# Patient Record
Sex: MALE | Race: BLACK OR AFRICAN AMERICAN | NOT HISPANIC OR LATINO
[De-identification: names, ages, dates, MRNs, and addresses within clinical notes are randomized per-mention and may not be internally consistent; named-entity substitution may affect disease eponyms.]

---

## 2023-09-22 PROBLEM — Z00.00 ENCOUNTER FOR PREVENTIVE HEALTH EXAMINATION: Status: ACTIVE | Noted: 2023-09-22

## 2023-09-25 ENCOUNTER — APPOINTMENT (OUTPATIENT)
Dept: UROLOGY | Facility: CLINIC | Age: 68
End: 2023-09-25
Payer: COMMERCIAL

## 2023-09-25 VITALS
HEART RATE: 56 BPM | HEIGHT: 75 IN | SYSTOLIC BLOOD PRESSURE: 133 MMHG | WEIGHT: 185 LBS | BODY MASS INDEX: 23 KG/M2 | DIASTOLIC BLOOD PRESSURE: 85 MMHG

## 2023-09-25 DIAGNOSIS — Z78.9 OTHER SPECIFIED HEALTH STATUS: ICD-10-CM

## 2023-09-25 DIAGNOSIS — Z80.6 FAMILY HISTORY OF LEUKEMIA: ICD-10-CM

## 2023-09-25 PROCEDURE — 99204 OFFICE O/P NEW MOD 45 MIN: CPT

## 2023-10-18 ENCOUNTER — RESULT REVIEW (OUTPATIENT)
Age: 68
End: 2023-10-18

## 2023-10-18 ENCOUNTER — OUTPATIENT (OUTPATIENT)
Dept: OUTPATIENT SERVICES | Facility: HOSPITAL | Age: 68
LOS: 1 days | End: 2023-10-18
Payer: COMMERCIAL

## 2023-10-18 DIAGNOSIS — C61 MALIGNANT NEOPLASM OF PROSTATE: ICD-10-CM

## 2023-10-18 PROCEDURE — 72197 MRI PELVIS W/O & W/DYE: CPT | Mod: 26

## 2023-10-18 PROCEDURE — 72197 MRI PELVIS W/O & W/DYE: CPT

## 2023-10-18 PROCEDURE — A9579: CPT

## 2023-10-19 DIAGNOSIS — C61 MALIGNANT NEOPLASM OF PROSTATE: ICD-10-CM

## 2023-11-02 ENCOUNTER — OUTPATIENT (OUTPATIENT)
Dept: OUTPATIENT SERVICES | Facility: HOSPITAL | Age: 68
LOS: 1 days | End: 2023-11-02
Payer: COMMERCIAL

## 2023-11-02 DIAGNOSIS — C61 MALIGNANT NEOPLASM OF PROSTATE: ICD-10-CM

## 2023-11-02 LAB
GLUCOSE BLDC GLUCOMTR-MCNC: 91 MG/DL — SIGNIFICANT CHANGE UP (ref 70–99)
GLUCOSE BLDC GLUCOMTR-MCNC: 91 MG/DL — SIGNIFICANT CHANGE UP (ref 70–99)

## 2023-11-02 PROCEDURE — 78816 PET IMAGE W/CT FULL BODY: CPT | Mod: PS

## 2023-11-02 PROCEDURE — 82962 GLUCOSE BLOOD TEST: CPT

## 2023-11-02 PROCEDURE — 78816 PET IMAGE W/CT FULL BODY: CPT | Mod: 26

## 2023-11-02 PROCEDURE — A9595: CPT

## 2023-11-03 DIAGNOSIS — C61 MALIGNANT NEOPLASM OF PROSTATE: ICD-10-CM

## 2023-11-13 ENCOUNTER — APPOINTMENT (OUTPATIENT)
Dept: UROLOGY | Facility: CLINIC | Age: 68
End: 2023-11-13
Payer: COMMERCIAL

## 2023-11-13 VITALS
HEIGHT: 75 IN | TEMPERATURE: 97.1 F | SYSTOLIC BLOOD PRESSURE: 144 MMHG | DIASTOLIC BLOOD PRESSURE: 81 MMHG | HEART RATE: 65 BPM | BODY MASS INDEX: 22.63 KG/M2 | WEIGHT: 182 LBS

## 2023-11-13 PROCEDURE — 99214 OFFICE O/P EST MOD 30 MIN: CPT

## 2023-11-17 ENCOUNTER — TRANSCRIPTION ENCOUNTER (OUTPATIENT)
Age: 68
End: 2023-11-17

## 2023-11-17 ENCOUNTER — APPOINTMENT (OUTPATIENT)
Dept: UROLOGY | Facility: CLINIC | Age: 68
End: 2023-11-17
Payer: COMMERCIAL

## 2023-11-17 VITALS
BODY MASS INDEX: 23 KG/M2 | HEART RATE: 66 BPM | WEIGHT: 185 LBS | DIASTOLIC BLOOD PRESSURE: 91 MMHG | OXYGEN SATURATION: 97 % | SYSTOLIC BLOOD PRESSURE: 145 MMHG | HEIGHT: 75 IN | TEMPERATURE: 98.2 F

## 2023-11-17 DIAGNOSIS — Z83.3 FAMILY HISTORY OF DIABETES MELLITUS: ICD-10-CM

## 2023-11-17 DIAGNOSIS — R97.20 ELEVATED PROSTATE, SPECIFIC ANTIGEN [PSA]: ICD-10-CM

## 2023-11-17 DIAGNOSIS — Z78.9 OTHER SPECIFIED HEALTH STATUS: ICD-10-CM

## 2023-11-17 PROCEDURE — 99214 OFFICE O/P EST MOD 30 MIN: CPT

## 2023-11-17 RX ORDER — MULTIVIT-MIN/IRON/FOLIC ACID/K 18-600-40
CAPSULE ORAL
Refills: 0 | Status: ACTIVE | COMMUNITY

## 2023-11-20 ENCOUNTER — OUTPATIENT (OUTPATIENT)
Dept: OUTPATIENT SERVICES | Facility: HOSPITAL | Age: 68
LOS: 1 days | End: 2023-11-20
Payer: COMMERCIAL

## 2023-11-20 ENCOUNTER — TRANSCRIPTION ENCOUNTER (OUTPATIENT)
Age: 68
End: 2023-11-20

## 2023-11-20 DIAGNOSIS — N42.89 OTHER SPECIFIED DISORDERS OF PROSTATE: ICD-10-CM

## 2023-11-20 LAB
ANION GAP SERPL CALC-SCNC: 12 MMOL/L
APPEARANCE: CLEAR
BACTERIA UR CULT: NORMAL
BACTERIA: NEGATIVE /HPF
BILIRUBIN URINE: NEGATIVE
BLOOD URINE: NEGATIVE
BUN SERPL-MCNC: 10 MG/DL
CALCIUM SERPL-MCNC: 9.2 MG/DL
CAST: 0 /LPF
CHLORIDE SERPL-SCNC: 104 MMOL/L
CO2 SERPL-SCNC: 26 MMOL/L
COLOR: YELLOW
CREAT SERPL-MCNC: 1.06 MG/DL
EGFR: 77 ML/MIN/1.73M2
EPITHELIAL CELLS: 0 /HPF
GLUCOSE QUALITATIVE U: NEGATIVE MG/DL
GLUCOSE SERPL-MCNC: 95 MG/DL
HCT VFR BLD CALC: 46.4 %
HGB BLD-MCNC: 13.9 G/DL
KETONES URINE: NEGATIVE MG/DL
LEUKOCYTE ESTERASE URINE: NEGATIVE
MCHC RBC-ENTMCNC: 26.2 PG
MCHC RBC-ENTMCNC: 30 GM/DL
MCV RBC AUTO: 87.4 FL
MICROSCOPIC-UA: NORMAL
NITRITE URINE: NEGATIVE
PH URINE: 5.5
PLATELET # BLD AUTO: 263 K/UL
POTASSIUM SERPL-SCNC: 4.7 MMOL/L
PROTEIN URINE: NEGATIVE MG/DL
PSA SERPL-MCNC: 6.64 NG/ML
RBC # BLD: 5.31 M/UL
RBC # FLD: 16.2 %
RED BLOOD CELLS URINE: 0 /HPF
SODIUM SERPL-SCNC: 141 MMOL/L
SPECIFIC GRAVITY URINE: 1.01
UROBILINOGEN URINE: 0.2 MG/DL
WBC # FLD AUTO: 8.45 K/UL
WHITE BLOOD CELLS URINE: 0 /HPF

## 2023-11-20 PROCEDURE — 76377 3D RENDER W/INTRP POSTPROCES: CPT | Mod: 26

## 2023-11-20 PROCEDURE — 76377 3D RENDER W/INTRP POSTPROCES: CPT

## 2023-11-20 RX ORDER — CALCIUM CARBONATE/VITAMIN D3 600 MG-10
TABLET ORAL
Refills: 0 | Status: ACTIVE | COMMUNITY

## 2023-11-20 RX ORDER — SOY PROTEIN
POWDER (GRAM) ORAL
Refills: 0 | Status: ACTIVE | COMMUNITY

## 2023-11-21 DIAGNOSIS — N42.89 OTHER SPECIFIED DISORDERS OF PROSTATE: ICD-10-CM

## 2023-11-27 ENCOUNTER — APPOINTMENT (OUTPATIENT)
Dept: UROLOGY | Facility: CLINIC | Age: 68
End: 2023-11-27
Payer: COMMERCIAL

## 2023-11-27 VITALS
WEIGHT: 185 LBS | OXYGEN SATURATION: 96 % | BODY MASS INDEX: 23.12 KG/M2 | DIASTOLIC BLOOD PRESSURE: 92 MMHG | TEMPERATURE: 98.2 F | SYSTOLIC BLOOD PRESSURE: 179 MMHG | HEART RATE: 61 BPM

## 2023-11-27 PROCEDURE — 99214 OFFICE O/P EST MOD 30 MIN: CPT

## 2023-12-01 ENCOUNTER — RESULT REVIEW (OUTPATIENT)
Age: 68
End: 2023-12-01

## 2023-12-01 ENCOUNTER — APPOINTMENT (OUTPATIENT)
Dept: MRI IMAGING | Facility: CLINIC | Age: 68
End: 2023-12-01
Payer: COMMERCIAL

## 2023-12-01 ENCOUNTER — OUTPATIENT (OUTPATIENT)
Dept: OUTPATIENT SERVICES | Facility: HOSPITAL | Age: 68
LOS: 1 days | End: 2023-12-01

## 2023-12-01 PROCEDURE — 72197 MRI PELVIS W/O & W/DYE: CPT | Mod: 26

## 2023-12-01 PROCEDURE — 76498P: CUSTOM | Mod: 26

## 2023-12-01 PROCEDURE — 76377 3D RENDER W/INTRP POSTPROCES: CPT | Mod: 26

## 2023-12-05 ENCOUNTER — NON-APPOINTMENT (OUTPATIENT)
Age: 68
End: 2023-12-05

## 2023-12-11 ENCOUNTER — OUTPATIENT (OUTPATIENT)
Dept: OUTPATIENT SERVICES | Facility: HOSPITAL | Age: 68
LOS: 1 days | End: 2023-12-11

## 2023-12-13 ENCOUNTER — TRANSCRIPTION ENCOUNTER (OUTPATIENT)
Age: 68
End: 2023-12-13

## 2023-12-13 NOTE — ASU PATIENT PROFILE, ADULT - FALL HARM RISK - UNIVERSAL INTERVENTIONS
Bed in lowest position, wheels locked, appropriate side rails in place/Call bell, personal items and telephone in reach/Instruct patient to call for assistance before getting out of bed or chair/Non-slip footwear when patient is out of bed/[place] to call system/Physically safe environment - no spills, clutter or unnecessary equipment/Purposeful Proactive Rounding/Room/bathroom lighting operational, light cord in reach Bed in lowest position, wheels locked, appropriate side rails in place/Call bell, personal items and telephone in reach/Instruct patient to call for assistance before getting out of bed or chair/Non-slip footwear when patient is out of bed/[place] to call system/Physically safe environment - no spills, clutter or unnecessary equipment/Purposeful Proactive Rounding/Room/bathroom lighting operational, light cord in reach

## 2023-12-14 ENCOUNTER — TRANSCRIPTION ENCOUNTER (OUTPATIENT)
Age: 68
End: 2023-12-14

## 2023-12-14 ENCOUNTER — RESULT REVIEW (OUTPATIENT)
Age: 68
End: 2023-12-14

## 2023-12-14 ENCOUNTER — APPOINTMENT (OUTPATIENT)
Dept: UROLOGY | Facility: AMBULATORY SURGERY CENTER | Age: 68
End: 2023-12-14

## 2023-12-14 ENCOUNTER — OUTPATIENT (OUTPATIENT)
Dept: OUTPATIENT SERVICES | Facility: HOSPITAL | Age: 68
LOS: 1 days | Discharge: HOME | End: 2023-12-14
Payer: COMMERCIAL

## 2023-12-14 VITALS
WEIGHT: 175.71 LBS | TEMPERATURE: 98 F | RESPIRATION RATE: 17 BRPM | HEIGHT: 75 IN | OXYGEN SATURATION: 97 % | HEART RATE: 56 BPM | SYSTOLIC BLOOD PRESSURE: 145 MMHG | DIASTOLIC BLOOD PRESSURE: 87 MMHG

## 2023-12-14 VITALS
HEART RATE: 58 BPM | TEMPERATURE: 97 F | OXYGEN SATURATION: 95 % | SYSTOLIC BLOOD PRESSURE: 143 MMHG | RESPIRATION RATE: 15 BRPM | DIASTOLIC BLOOD PRESSURE: 85 MMHG

## 2023-12-14 DIAGNOSIS — Z98.890 OTHER SPECIFIED POSTPROCEDURAL STATES: Chronic | ICD-10-CM

## 2023-12-14 PROCEDURE — 55700: CPT | Mod: 22

## 2023-12-14 PROCEDURE — 76999F: CUSTOM | Mod: 26

## 2023-12-14 PROCEDURE — G0416: CPT | Mod: 26

## 2023-12-14 RX ORDER — ONDANSETRON 8 MG/1
4 TABLET, FILM COATED ORAL ONCE
Refills: 0 | Status: DISCONTINUED | OUTPATIENT
Start: 2023-12-14 | End: 2023-12-14

## 2023-12-14 RX ORDER — SODIUM CHLORIDE 9 MG/ML
1000 INJECTION, SOLUTION INTRAVENOUS
Refills: 0 | Status: DISCONTINUED | OUTPATIENT
Start: 2023-12-14 | End: 2023-12-14

## 2023-12-14 RX ORDER — FENTANYL CITRATE 50 UG/ML
25 INJECTION INTRAVENOUS
Refills: 0 | Status: DISCONTINUED | OUTPATIENT
Start: 2023-12-14 | End: 2023-12-14

## 2023-12-14 NOTE — PRE-ANESTHESIA EVALUATION ADULT - NSANTHOSAYNRD_GEN_A_CORE
No. DAMINE screening performed.  STOP BANG Legend: 0-2 = LOW Risk; 3-4 = INTERMEDIATE Risk; 5-8 = HIGH Risk No. DAMIEN screening performed.  STOP BANG Legend: 0-2 = LOW Risk; 3-4 = INTERMEDIATE Risk; 5-8 = HIGH Risk

## 2023-12-14 NOTE — ASU DISCHARGE PLAN (ADULT/PEDIATRIC) - NS MD DC FALL RISK RISK
For information on Fall & Injury Prevention, visit: [email or web address] OR  [email or web address] OR  https://www.cdc.gov/steadi/patient.html For information on Fall & Injury Prevention, visit: [email or web address]://www.[facility]/[email or web address]-[email or web address]-[email or web address]-mobility OR  [email or web address]://www.[facility]/news/[email or web address]-[email or web address]-to-[email or web address]-injury OR  https://www.cdc.gov/steadi/patient.html

## 2023-12-14 NOTE — ASU DISCHARGE PLAN (ADULT/PEDIATRIC) - ASU DC SPECIAL INSTRUCTIONSFT
PROSTATE BIOPSY    GENERAL: Expect blood in the urine, stool, and ejaculate for up to two (2) months postoperatively. This is normal and to be expected after this procedure. Increase hydration to keep the urine as clear as possible.    SURGICAL WOUND: There are often lumps and bumps that can be felt in the perineum (skin between scrotum and anus) for up to two (2) months or more post operatively. These are of no concern and with time they will soften and disappear.  Any “black and blue” bruising areas will also resolve.  You may apply an ice-pack for 15 minutes out of every hour for the first 24 -36 hours to minimize pain and swelling. You may apply over the counter Bacitracin to the wound several times a day, and keep covered with over the counter gauze as necessary.    CATHETER: Some patients are sent home with a Carmona catheter, while others go home urinating on their own. A Carmona catheter continuously drains the urine from the bladder. If you still have a catheter, the nurses will review instructions and care before you go home. For men, you may have a prescription for lidocaine jelly to apply to the tip of your penis, as needed, for catheter related discomfort.     PAIN: You may have some intermittent pain for up to six (6) weeks post operatively. Pain does not signify any problem unless associated with fever, chills, or inability to void.  If you experience any fevers or chills please call immediately as this may be signs of an infection. You may take Tylenol (acetaminophen) 650-975mg and/or Motrin (ibuprofen) 400-600mg, both available over the counter, for pain every 6 hours as needed. Do not exceed 4000mg of Tylenol (acetaminophen) daily. You may alternate these medications such that you take one or the other every 3 hours for around the clock pain coverage.     ANTICOAGULATION: If you are taking any blood thinning medications, please discuss with your urologist prior to restarting these medications unless otherwise specified.    BATHING: You may shower with soap and water, and pat dry the needle insertion sites in the perineum. Avoid sitting in water for prolonged periods of time for the next few days.    DIET: You may resume your regular diet and regular medication regimen.    ACTIVITY: No heavy lifting or strenuous exercise until you are evaluated at your post-operative appointment. Otherwise, you may return to your usual level of physical activity.    FOLLOW-UP: If you did not already schedule your post-operative appointment, please call your urologist to schedule and follow-up appointment.    CALL YOUR UROLOGIST IF: You have any bleeding that does not stop, inability to void >8 hours, fever over 100.4 F, chills, persistent nausea/vomiting, changes in your incision concerning for infection, or if your pain is not controlled on your discharge pain medications.

## 2023-12-14 NOTE — BRIEF OPERATIVE NOTE - NSICDXBRIEFPROCEDURE_GEN_ALL_CORE_FT
PROCEDURES:  Biopsy, prostate, with integrated MRI-transrectal US guidance 14-Dec-2023 16:32:50  Evette Heredia

## 2023-12-15 ENCOUNTER — NON-APPOINTMENT (OUTPATIENT)
Age: 68
End: 2023-12-15

## 2023-12-20 LAB
SURGICAL PATHOLOGY STUDY: SIGNIFICANT CHANGE UP
SURGICAL PATHOLOGY STUDY: SIGNIFICANT CHANGE UP

## 2023-12-21 ENCOUNTER — NON-APPOINTMENT (OUTPATIENT)
Age: 68
End: 2023-12-21

## 2024-01-03 ENCOUNTER — APPOINTMENT (OUTPATIENT)
Dept: UROLOGY | Facility: CLINIC | Age: 69
End: 2024-01-03
Payer: COMMERCIAL

## 2024-01-03 VITALS
WEIGHT: 185 LBS | HEIGHT: 75 IN | TEMPERATURE: 98 F | BODY MASS INDEX: 23 KG/M2 | HEART RATE: 64 BPM | SYSTOLIC BLOOD PRESSURE: 147 MMHG | DIASTOLIC BLOOD PRESSURE: 88 MMHG

## 2024-01-03 PROBLEM — Z78.9 OTHER SPECIFIED HEALTH STATUS: Chronic | Status: ACTIVE | Noted: 2023-12-14

## 2024-01-03 PROCEDURE — 99214 OFFICE O/P EST MOD 30 MIN: CPT

## 2024-01-03 PROCEDURE — 99215 OFFICE O/P EST HI 40 MIN: CPT

## 2024-01-03 NOTE — ADDENDUM
[FreeTextEntry1] : I, Dr. Angeles, personally performed the evaluation and management (E/M) services for this established patient who presents today with (a) new problem(s)/exacerbation of (an) existing condition(s).  That E/M includes conducting the examination, assessing all new/exacerbated conditions, and establishing a new plan of care.  Today, my ACP, Jane Carter, was here to observe my evaluation and management services for this new problem/exacerbated condition to be followed going forward.

## 2024-01-03 NOTE — PHYSICAL EXAM
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 PSMA PET/CT is negative for PSMA avid lesions.  The patient is opposed to surgery, he is very worried about ED and that is a high probability given lesion location - we could try to spare the right NVB  1. case to discuss with Dr. Godfrey - he wants a second opinion 2. discussed radiation is not best option for salvage - we discussed sometimes brachytherapy is done 3. candidate for salvage cryo with will needs spaceOAR Thank you very much for allowing me to assist in the care of this patient. Please do not hesitate to contact me with any additional questions or concerns.      Sincerely,     Presley Angeles D.O. Professor of Urology and Radiology  of Urology at [facility] Director for Prostate Cancer [address] [facility], [facility] Phone: [phone]

## 2024-01-03 NOTE — DISEASE MANAGEMENT
[1] : T1 [c] : c [X] : MX [0-10] : 0 -10 ng/mL [Biopsy] : Patient had a biopsy on [7(3+4)] : Template Biopsy [place] Score: 7(3+4) [IIB] : IIB [Radiation Therapy] : Radiation Therapy [BiopsyDate] : 4/23/2012 [TotalCores] : 12 [TotalPositiveCores] : 4 [MaxCoreInvolvement] : 50 [FreeTextEntry1] : GG3 detected on 12/14/23 Total number of cores at biopsy: 13. Total # of positives cores: 6. Max % Core Involvement: 90.   Template Biopsy Matthew Score: 7(4+3). Fusion Biopsy Boling Score: 7(3+4).

## 2024-01-03 NOTE — HISTORY OF PRESENT ILLNESS
[FreeTextEntry1] : Dear Dr. Jimenez,  Thank you so much for the referral to help care for your patient.  Chief Complaint: PSA Recurrence post Radiation Therapy Date of first visit: 11/17/2023  ABBEY REDDING is a 68 year old Black man with no PMHx who presents for prostate cancer with biochemical recurrence. He underwent a targeted prostate biopsy with the UroNav MRI fusion on 12/14/23. The biopsy was positive for GG3 recurrence. His PSA is 6.64 ng/ml.  This was focused on the left side only c/w with his prior known cyber knife.   He was diagnosed with GG2 in 2012 and treated with 35Gy (Cyberknife) 10/15/12-10/19/12 with Dr Snowden. No ADT was administered. He had a negative CT and bone scan at the time of diagnosis. His PSA at diagnosis was 3.1 ng/ml, PSA sarai 0.17 ng/ml, and most recent is 4.2 ng/ml. MRI on 10/18/23 demonstrated a PIRADS 4 lesion left mid-pzpm. He denies family history of prostate cancer.  11/2/23 PSMA PET/CT IMPRESSION: No current evidence of PSMA (PYLARIFY) avid lesions..  PSA Hx 6.64  11/17/2023 4.21 9/2023 1.32 9/2022 0.17 2021 0.17 2017  MRI Hx: MRI at [facility] on 10/18/2023. 19cc prostate with PIRADS 4 lesion measuring 56x16oi left mid pzpm. No LAD No EPE, No Bony Lesions. The clinical implications were discussed with the patient.  there is significant motion degredation.   Biopsy Hx 12/14/23- with Dr Angeles The standard biopsy detected GG3-GG2 cancer 5/12 cores (GG3 midline apex 60% 7mm 60% pattern 4 with PNI. GG2 COLE MB LPA LPB 90% 10mm 30% pattern 4 with PNI). Atypical glands detected in LL core. The targeted biopsy detected GG2 cancer (left base PZp 75% 7.5mm 20% pattern 4 with PNI).  4/23/2012- Left apex- GG1 1mm 14$ Left mid- GG2 7m 50% Left base- GG2 1.5mm 9% LLA- atypia LLM- GG2 2.5mm 20%  1/3/2024 IPSS 0 QOL 0  FRANCINE 25  11/17/2023 IPSS 3 QOL 0 FRANCINE 25  The patient denies fevers, chills, nausea and or vomiting and no unexplained weight loss.  All pertinent laboratory, films and physician notes were reviewed. Questionnaire results were discussed with patient.  Discussion with the patient: Risks/Benefits/FDA recommendations for prostate cancer screening, the natural history of prostate cancer, the concept of "competing risks", options for treatment including active surveillance, open and laparoscopic (Robotic) radical prostatectomy, external beam radiation therapy, interstitial brachytherapy ("seeds"), combined therapies, hormonal therapies, orchiectomy, and cryotherapy. The potential side effects, early and delayed risks, and effectiveness of each treatment was discussed. The specific details of this patient's disease and their bearing on the above were discussed. The patient was offered the opportunity to meet with the Radiation Oncologists. The patient asked appropriate contextual questions, indicating a good level of understanding.   We discussed the surgical options for management, including robotic and open prostatectomy. The patient understands that the risks of these procedures include bleeding infection, damage to the rectum and surrounding organs, and ED and urinary incontinence, both of which may be persistent. The advantages include removing the entire prostate for more accurate pathologic staging. There is more blood loss with the open approach than with the laparoscopic approach.   We also discussed the options of radiation (external beam, stereotactic body radiation therapy, brachytherapy or combination therapies) typically not done for Recurence. Definitive brachytherapy at our institution is done with Pd-103 either as monotherapy or in combination with external beam radiation (Dependent on risk).  The patient understands that the risks of radiation include increased LUTS, diarrhea, hematuria, difficulty urinating, ED, and urinary frequency.   We also discussed active surveillance. The patient understands that this is not a treatment but a way of monitoring potentially indolent disease. The patient understands that this has minimal side effects but there is a risk of disease progression.   We discussed salvage cryoablation with Oliva.   The risks include rectal injury, ED, and urethral stricture.    The patient and I discussed all of these options as well as their risks and benefits, and I believe I answered his questions. Based on the patient's disease characteristics, I feel the patient would be a good candidate for cryo, salvage surgery The patient decided to see Dr Saunders

## 2024-01-08 ENCOUNTER — APPOINTMENT (OUTPATIENT)
Dept: UROLOGY | Facility: CLINIC | Age: 69
End: 2024-01-08
Payer: COMMERCIAL

## 2024-01-08 PROCEDURE — 99214 OFFICE O/P EST MOD 30 MIN: CPT

## 2024-01-11 NOTE — LETTER BODY
[Dear  ___] : Dear  [unfilled], [Consult Letter:] : I had the pleasure of evaluating your patient, [unfilled]. [Please see my note below.] : Please see my note below. [Sincerely,] : Sincerely, [FreeTextEntry3] : Panfilo Jimenez MD, FACS

## 2024-01-11 NOTE — ASSESSMENT
[FreeTextEntry1] : 68-year-old with history of prostate cancer treated 11 years ago via CyberKnife as per patient. Patient states he was treated by Dr. Snowden in [facility].  Matthew score and prior records are not available to review.  Patient understands that he will need to get his prior records to further review.  He presents to office referred by his primary medical doctor for elevated PSA.  In 2017 his PSA was 0.17 ng/mL.  PSA of 2021 was 0.17 ng/mL.  PSA in September 2022 was 1.32 ng/mL.  His most recent PSA in September 2023 is 4.21 ng/mL.  Patient reports feeling well.  He denies any difficulties urinating.  He denies dysuria and gross hematuria.  MRI prostate 10/18/2023 IMPRESSION:  14 x 10 mm left posteromedial midgland (extending from base to apex) peripheral zone lesion. PI-RADS 4, high (clinically significant cancer likely). There is capsular bulging without gross extra prostatic extension.  No seminal vesicle invasion or pelvic lymphadenopathy.  PSMA scan 11/2023 Head/Neck: No biologically active head/neck lesions. Normal uptake within the brain, pharyngeal lymphoid tissue, salivary glands and laryngeal musculature is noted. Thorax:   No suspicious hypermetabolic mediastinal, hilar, lung parenchymal lesions. Abdomen/Pelvis: Physiologic GI/  activity. No abnormal visceral or shan tracer uptake is present. Surgical clips Musculoskeletal :  No suspicious hypermetabolic osseous lesions.  IMPRESSION:  No current evidence of  PSMA (PYLARIFY) avid lesions. Trabeculated urinary bladder may be seen with chronic outlet obstruction.  biopsy 12/14/2023 Final Diagnosis 1. Prostate, left anterior apex, needle biopsy - Residual/recurrent adenocarcinoma of the prostate, Prognostic Grade Group 2 ([place] score 3+4=7) involving 70% (7 mm in length) of 1 of 1 core - [place] pattern 4 comprises 10% of tumor  5. Prostate, midline apex, needle biopsy - Residual/recurrent adenocarcinoma of the prostate, Prognostic Grade Group 3 (Matthew score 4+3=7) involving 60% (7 mm in length) of 1 of 1 core - Matthew pattern 4 comprises 60% of tumor - Perineural invasion present  6. Prostate, midline base, needle biopsy - Residual/recurrent adenocarcinoma of the prostate, Prognostic Grade Group 2 (Matthew score 3+4=7) involving 25% (2 mm in length) of 1 of 1 core - Matthew pattern 4 comprises <10% of tumor  7. Prostate, left posterior apex, needle biopsy - Residual/recurrent adenocarcinoma of the prostate, Prognostic Grade Group 2 (Matthew score 3+4=7) involving 45% (4.5 mm in length) of 1 of 1 core - [place] pattern 4 comprises 10% of tumor  8. Prostate, left posterior base, needle biopsy - Residual/recurrent adenocarcinoma of the prostate, Prognostic Grade Group 2 (Matthew score 3+4=7) involving 90% (10 mm in length) of 1 of 1 core - [place] pattern 4 comprises 30% of tumor - Perineural invasion present  13. Prostate, left base PZP, needle biopsy - Residual/recurrent adenocarcinoma of the prostate, Prognostic Grade Group 2 (Matthew score 3+4=7) involving 75%, 5% and <5% (7.5 mm, 0.5 mm and 0.5 mm in length) of 3 of 3 cores - [place] pattern 4 comprises 20% of tumor - Perineural invasion present  the pathology was reviewed with the patient I discussed the options in detail  Plan the patient is not interested in cryotherapy or HIFU or salvage prostatectomy  the patient understands the risks and benefits of all optinos all options clearly outlined the radiographic studies were reviewed - Dr. Urena for consultation - Dr. Sales for HIGU - f/u with me after the aforementioned evaluation

## 2024-01-11 NOTE — HISTORY OF PRESENT ILLNESS
[FreeTextEntry1] : 68-year-old with history of prostate cancer treated 11 years ago via CyberKnife as per patient. Patient states he was treated by Dr. Snowden in [facility] [place].  Matthew score and prior records are not available to review.  Patient understands that he will need to get his prior records to further review.  He presents to office referred by his primary medical doctor for elevated PSA.  In 2017 his PSA was 0.17 ng/mL.  PSA of 2021 was 0.17 ng/mL.  PSA in September 2022 was 1.32 ng/mL.  His most recent PSA in September 2023 is 4.21 ng/mL.  Patient reports feeling well.  He denies any difficulties urinating.  He denies dysuria and gross hematuria.  MRI prostate 10/18/2023 IMPRESSION:  14 x 10 mm left posteromedial midgland (extending from base to apex) peripheral zone lesion. PI-RADS 4, high (clinically significant cancer likely). There is capsular bulging without gross extra prostatic extension.  No seminal vesicle invasion or pelvic lymphadenopathy.  PSMA scan 11/2023 Head/Neck: No biologically active head/neck lesions. Normal uptake within the brain, pharyngeal lymphoid tissue, salivary glands and laryngeal musculature is noted. Thorax:   No suspicious hypermetabolic mediastinal, hilar, lung parenchymal lesions. Abdomen/Pelvis: Physiologic GI/  activity. No abnormal visceral or shan tracer uptake is present. Surgical clips Musculoskeletal :  No suspicious hypermetabolic osseous lesions.  IMPRESSION:  No current evidence of  PSMA (PYLARIFY) avid lesions. Trabeculated urinary bladder may be seen with chronic outlet obstruction.  biopsy 12/14/2023 Final Diagnosis 1. Prostate, left anterior apex, needle biopsy - Residual/recurrent adenocarcinoma of the prostate, Prognostic Grade Group 2 ([place] score 3+4=7) involving 70% (7 mm in length) of 1 of 1 core - [place] pattern 4 comprises 10% of tumor  5. Prostate, midline apex, needle biopsy - Residual/recurrent adenocarcinoma of the prostate, Prognostic Grade Group 3 (Matthew score 4+3=7) involving 60% (7 mm in length) of 1 of 1 core - Matthew pattern 4 comprises 60% of tumor - Perineural invasion present  6. Prostate, midline base, needle biopsy - Residual/recurrent adenocarcinoma of the prostate, Prognostic Grade Group 2 (Matthew score 3+4=7) involving 25% (2 mm in length) of 1 of 1 core - Matthew pattern 4 comprises <10% of tumor  7. Prostate, left posterior apex, needle biopsy - Residual/recurrent adenocarcinoma of the prostate, Prognostic Grade Group 2 ([place] score 3+4=7) involving 45% (4.5 mm in length) of 1 of 1 core - [place] pattern 4 comprises 10% of tumor  8. Prostate, left posterior base, needle biopsy - Residual/recurrent adenocarcinoma of the prostate, Prognostic Grade Group 2 (Matthew score 3+4=7) involving 90% (10 mm in length) of 1 of 1 core - [place] pattern 4 comprises 30% of tumor - Perineural invasion present  13. Prostate, left base PZP, needle biopsy - Residual/recurrent adenocarcinoma of the prostate, Prognostic Grade Group 2 (Matthew score 3+4=7) involving 75%, 5% and <5% (7.5 mm, 0.5 mm and 0.5 mm in length) of 3 of 3 cores - [place] pattern 4 comprises 20% of tumor - Perineural invasion present  the pathology was reviewed with the patient I discussed the options in detail   [Urinary Retention] : no urinary retention [Urinary Frequency] : no urinary frequency [Nocturia] : no nocturia [Straining] : no straining [Weak Stream] : no weak stream [Dysuria] : no dysuria [Hematuria - Gross] : no gross hematuria

## 2024-01-12 ENCOUNTER — OUTPATIENT (OUTPATIENT)
Dept: OUTPATIENT SERVICES | Facility: HOSPITAL | Age: 69
LOS: 1 days | End: 2024-01-12
Payer: COMMERCIAL

## 2024-01-12 ENCOUNTER — APPOINTMENT (OUTPATIENT)
Dept: RADIATION ONCOLOGY | Facility: HOSPITAL | Age: 69
End: 2024-01-12
Payer: COMMERCIAL

## 2024-01-12 ENCOUNTER — NON-APPOINTMENT (OUTPATIENT)
Age: 69
End: 2024-01-12

## 2024-01-12 VITALS
OXYGEN SATURATION: 98 % | RESPIRATION RATE: 16 BRPM | SYSTOLIC BLOOD PRESSURE: 150 MMHG | HEART RATE: 59 BPM | BODY MASS INDEX: 22.75 KG/M2 | DIASTOLIC BLOOD PRESSURE: 73 MMHG | HEIGHT: 75 IN | WEIGHT: 183 LBS | TEMPERATURE: 98.2 F

## 2024-01-12 DIAGNOSIS — Z98.890 OTHER SPECIFIED POSTPROCEDURAL STATES: Chronic | ICD-10-CM

## 2024-01-12 DIAGNOSIS — C61 MALIGNANT NEOPLASM OF PROSTATE: ICD-10-CM

## 2024-01-12 PROCEDURE — 99204 OFFICE O/P NEW MOD 45 MIN: CPT

## 2024-01-12 NOTE — VITALS
[Date: ____________] : Patient's last distress assessment performed on [unfilled]. [0 - No Distress] : Distress Level: 0 [Patient given social work contact information and resource sheet] : Patient was given social work contact information and resource sheet [Maximal Pain Intensity: 0/10] : 0/10 [90: Able to carry normal activity; minor signs or symptoms of disease.] : 90: Able to carry normal activity; minor signs or symptoms of disease.  [ECOG Performance Status: 0 - Fully active, able to carry on all pre-disease performance without restriction] : Performance Status: 0 - Fully active, able to carry on all pre-disease performance without restriction

## 2024-01-12 NOTE — PHYSICAL EXAM
[General Appearance - Well Developed] : well developed [General Appearance - Alert] : alert [General Appearance - In No Acute Distress] : in no acute distress [] : no respiratory distress [Oriented To Time, Place, And Person] : oriented to person, place, and time

## 2024-01-16 DIAGNOSIS — C61 MALIGNANT NEOPLASM OF PROSTATE: ICD-10-CM

## 2024-01-23 NOTE — END OF VISIT
[Time Spent: ___ minutes] : I have spent [unfilled] minutes of time on the encounter. [FreeTextEntry3] : MD Note: I was present with the N.P. during the key portions of the history and exam. I agree with the findings and plan as documented in the N.P's note, unless noted below.  I was physically present for the key portions of the evaluation and management service provided. I agree with the history and physical, and plan which I have reviewed and edited where appropriate.

## 2024-01-23 NOTE — HISTORY OF PRESENT ILLNESS
[FreeTextEntry1] : Mr.Wilfredo Laguerre is a 68yr old male here for initial consultation. He has h/o prostate cancer, and as per patient, he was treated in 2012 with "cyberknife" in [place] with .(Records pending). He was referred to urology by his PCP last September after his PSA showed elevation. PSA had gone from 0.17ng/ml in 2021 to 1.32ng/ml in Sept 2022. and then his PSA in Sept 2023 was up to 4.21ng/ml. Most recent PSA was 6.64ng/ml from 11/20/2023. He underwent a targeted prostate biopsy with the UroNav MRI fusion on 12/14/23 which showed recurrence, 5/13 cores with Matthew 7(3+4) and 1/13 cores with Matthew 7(4+3). He has also seen  from [facility] for a consultation. He denies any bothersome urinary symptoms, and extremely concerned with possibly getting side effects of any urinary incontinence or any erectile dysfunction. His EPIC and IPSS scores are "0" on todays assessment.  PSA Trend: 9/2021 0.17ng/ml 9/2022 1.32ng/ml 9/2023 4.21ng/ml 11/2023 6.64ng/ml   Most recent imaging: PSMA 11/2/2023: IMPRESSION: No current evidence of  PSMA (PYLARIFY) avid lesions.  MRI prostate:12/1/2023: IMPRESSION: Left, posterior medial-lateral (PZpm-pl), base-to-midgland, peripheral zone lesion as detailed above. Overlying capsular bulge suspicious for NIKOLAI. SILVESTRE and boundary marked in DCAD. PIRADS 5 - Very high (clinically significant cancer is highly likely to be present)  No seminal vesicle invasion or pelvic lymphadenopathy.  Prostate Volume: 27 mL PSA density: 0.24 ng/mL/mL  Most recent prostate biopsy pathology 12/14/2023: Final Diagnosis 1. Prostate, left anterior apex, needle biopsy- Residual/recurrent adenocarcinoma of the prostate, Prognostic Grade Group 2 (Scalf score 3+4=7) involving 70% (7 mm in length) of 1of 1 core - Scalf pattern 4 comprises 10% of tumor 2. Prostate, left anterior base, needle biopsy- Benign prostatic tissue with radiotherapy related changes 3. Prostate, right anterior apex, needle biopsy- Benign prostatic tissue with radiotherapy related changes 4. Prostate, right anterior base, needle biopsy- Benign prostatic tissue with radiotherapy related changes 5. Prostate, midline apex, needle biopsy- Residual/recurrent adenocarcinoma of the prostate, Prognostic Grade Group 3 (Matthew score 4+3=7) involving 60% (7 mm in length) of 1of 1 core - Matthew pattern 4 comprises 60% of tumor- Perineural invasion present 6. Prostate, midline base, needle biopsy- Residual/recurrent adenocarcinoma of the prostate, Prognostic Grade Group 2 (Scalf score 3+4=7) involving 25% (2 mm in length) of 1of 1 core - Scalf pattern 4 comprises <10% of tumor 7. Prostate, left posterior apex, needle biopsy- Residual/recurrent adenocarcinoma of the prostate, Prognostic Grade Group 2 (Scalf score 3+4=7) involving 45% (4.5 mm in length) of 1of 1 core - Scalf pattern 4 comprises 10% of tumor 8. Prostate, left posterior base, needle biopsy- Residual/recurrent adenocarcinoma of the prostate, Prognostic Grade Group 2 (Matthew score 3+4=7) involving 90% (10 mm in length) of 1of 1 core - Scalf pattern 4 comprises 30% of tumor- Perineural invasion present 9. Prostate, right posterior apex, needle biopsy- Benign fibromuscular tissue 10. Prostate, right posterior base, needle biopsy- Benign prostatic tissue with radiotherapy related changes 11. Prostate, left lateral, needle biopsy- Small focus of atypical glands, suspicious for adenocarcinoma 12. Prostate, right lateral, needle biopsy- Benign prostatic tissue with radiotherapy related changes 13. Prostate, left base PZP, needle biopsy- Residual/recurrent adenocarcinoma of the prostate, Prognostic Grade Group 2 (Scalf score 3+4=7) involving 75%, 5% and <5% (7.5 mm, 0.5 mm and 0.5 mm in length) of 3 of 3 cores- Scalf pattern 4 comprises 20% of tumor - Perineural invasion present

## 2024-02-23 ENCOUNTER — APPOINTMENT (OUTPATIENT)
Dept: UROLOGY | Facility: CLINIC | Age: 69
End: 2024-02-23
Payer: COMMERCIAL

## 2024-02-23 VITALS
TEMPERATURE: 98 F | WEIGHT: 183 LBS | DIASTOLIC BLOOD PRESSURE: 84 MMHG | OXYGEN SATURATION: 98 % | RESPIRATION RATE: 16 BRPM | HEIGHT: 75 IN | HEART RATE: 56 BPM | BODY MASS INDEX: 22.75 KG/M2 | SYSTOLIC BLOOD PRESSURE: 143 MMHG

## 2024-02-23 PROCEDURE — 99214 OFFICE O/P EST MOD 30 MIN: CPT | Mod: 25

## 2024-02-23 PROCEDURE — 81003 URINALYSIS AUTO W/O SCOPE: CPT | Mod: QW

## 2024-02-25 LAB
BILIRUB UR QL STRIP: NORMAL
COLLECTION METHOD: NORMAL
GLUCOSE UR-MCNC: NORMAL
HCG UR QL: 0.2 EU/DL
HGB UR QL STRIP.AUTO: NORMAL
KETONES UR-MCNC: NORMAL
LEUKOCYTE ESTERASE UR QL STRIP: NORMAL
NITRITE UR QL STRIP: NORMAL
PH UR STRIP: 5.5
PROT UR STRIP-MCNC: NORMAL
SP GR UR STRIP: 1.01

## 2024-02-25 NOTE — HISTORY OF PRESENT ILLNESS
[FreeTextEntry1] : 69 yo male pt who was referred by Dr. Jimenez to discuss HIFU. He has a history of prostate cancer treated 12 years ago with a Cyberknife with a Dr. Snowden in [place]. His PSA then veered to 0.17 and it was gradually rising to 1.32 in September of 2022 and then 4.21 in Sept 2023, and 6.64 in Nov. 2023. He underwent biopsies which showed 5/13 cores with Lang[place] 3,4. He would like to retry Cyberknife and will be seeing someone at [facility] and  regarding that issue. He comes in to discuss High frequency ultrasound treatment, which is available now with me. He is currently sexually active with reasonable erections and mild urinary frequency. he denies any gross hematuria and dysuria and had overall excellent tolerance and no side effects with the Cyberknife.

## 2024-02-25 NOTE — ASSESSMENT
[FreeTextEntry1] : 67 yo male pt with recurrent prostate cancer grade group 3 in 1 core, otherwise grade group 2 disease. I discussed some of the pros and cons related to High frequency ultrasound treatment. He is going to follow up with [facility] and also radiation at [facility] to rediscuss Cyberknife therapy. If he has further interest he'll contact me. I have told him that Data for grade group 3 would not be as robust as data for grade group 2. All of his questions were answered. Total time=30 min.  The submitted E/M billing level for this visit reflects the total time spent on the day of the visit including face-to-face time spent with the patient, non-face-to-face review of medical records and relevant information, documentation, and asynchronous communication with the patient after a visit via phone, email, or patients EHR portal after the visit. The medical records reviewed are either scanned into the chart or reviewed with the patient using a patients electronic medical records portal for patients with records not available to [facility] via electronic transmission platforms from other institutions and labs. Time spent counseling and performing coordination of care was also included in determining the appropriate EM billing level.   I have reviewed and verified information regarding the chief complaint and history recorded by the ancillary staff and/or the patient. I have independently reviewed and interpreted tests performed by other physicians and facilities as necessary.   I have discussed with the patient differential diagnosis, reason for auxiliary tests if ordered, risks, benefits, alternatives, and complications of each form of therapy were discussed.

## 2024-03-04 ENCOUNTER — APPOINTMENT (OUTPATIENT)
Dept: RADIATION ONCOLOGY | Facility: CLINIC | Age: 69
End: 2024-03-04
Payer: COMMERCIAL

## 2024-03-04 ENCOUNTER — NON-APPOINTMENT (OUTPATIENT)
Age: 69
End: 2024-03-04

## 2024-03-04 VITALS
DIASTOLIC BLOOD PRESSURE: 90 MMHG | HEART RATE: 60 BPM | OXYGEN SATURATION: 99 % | WEIGHT: 180 LBS | HEIGHT: 75 IN | RESPIRATION RATE: 16 BRPM | TEMPERATURE: 98.3 F | SYSTOLIC BLOOD PRESSURE: 140 MMHG | BODY MASS INDEX: 22.38 KG/M2

## 2024-03-04 DIAGNOSIS — C61 MALIGNANT NEOPLASM OF PROSTATE: ICD-10-CM

## 2024-03-04 PROCEDURE — 99205 OFFICE O/P NEW HI 60 MIN: CPT

## 2024-03-04 NOTE — DISEASE MANAGEMENT
[MeasuredProstateVolume] : 27ml [BiopsyDate] : 12/14/2023 [TotalCores] : 13 [TotalPositiveCores] : 6 [MaxCoreInvolvement] : 90%

## 2024-03-04 NOTE — PHYSICAL EXAM
[Normal] : normal external genitalia without lesions and no testicular masses [Normal] : no focal deficits

## 2024-03-04 NOTE — HISTORY OF PRESENT ILLNESS
[FreeTextEntry1] :   El Ruano is a 68 year old male with no family history of Prostate Ca who has a  known history of Prostate Cancer in 2012 in [place] with Dr. Snowden  as per pt. treated with cyberknife. treatmenet for prostate cancer. Matthew score and prior records are not availabel to review and pt will obtain the records for us to review.  Mr. Dumont  September 2023 his PCP noted an increase in his PSA value  4.21ng.mL. He underwent MRi fusion Bx on 11/17/2023 that revealed 6 out of 13 cores are positive with 20%-90% tissue involvement. one core with [place] score of 7 ( 4+3) and five cores with a Matthew Score 7 ( 3+4) with  a diagnosis of  unfavorable intermediate risk Prostatic Adenocarcinoma,  (Urologist: Dr. Barbara Ortiz Ma)  PSA Trend: ng/mL  09/2021:         0.17ng/mL 09/2022:         1.32ng/mL 09/12/2022:    4.21ng/mL 11/27/2023:    6.64ng/mL  MRI Prostate with and without  12/1/2023  IMPRESSION: Left, posterior medial-lateral (PZpm-pl), base-to-midgland, peripheral zone lesion as detailed above. Overlying capsular bulge suspicious for NIKOLAI. SILVESTRE and boundary marked in DCAD. PIRADS 5 - Very high (clinically significant cancer is highly likely to be present)  No seminal vesicle invasion or pelvic lymphadenopathy.  Prostate Volume: 27 mL PSA density: 0.24 ng/mL/mL  LESION: #1 Location: Left, posterior medial-lateral (PZpm-pl), base-to-midgland, peripheral zone. Slice#: Series 4, Image 14. Size (transverse, AP, CC): 22 x 10 x 16 mm. T2-WI: 2 - diffuse mild hypointensity, indistinct margin. DWI: 5 - Focal markedly hypointense on ADC and markedly hyperintense on high b-value DWI; greater than 1.5 cm in greatest dimension and invasive behavior. DCE: Positive - focal, and; earlier than or contemporaneous with enhancement of adjacent normal prostatic tissues, and; corresponds to suspicious finding on T2W and/or DWI. Extra-prostatic extension: Tumor bulges or deforms capsule without neurovascular bundle thickening. Prior Comparison: None. PI-RADS Assessment Category: 5, Very High  Neurovascular bundle: Left-sided neurovascular bundle abutment. Seminal vesicles: No seminal vesicle invasion. Lymph nodes: No pelvic adenopathy. Bones: No suspicious lesions identified. Urinary bladder: Underdistended limiting evaluation. Other: None.   PATHOLOGY: 12/1/2023  6 out of 13 cores positive [place] Score 7 =(4+3)-  1 core [place] Score 7 =( 3+4)   5 cores GG 20-90% tissue involvement  PET /CT PSMA  11/2/2023  IMPRESSION:  No current evidence of  PSMA (PYLARIFY) avid lesions..   Patient presents today for consideration of radiation therapy options to the prostate, referred by Dr. Urena Overall, the patient states he feels well and he did receive SBRT  about 11 years ago in [place] by Dr. Snowden ( pt will need to provide us with the information)py in the past. He notes baseline urine function with and nocturia x1 no use of flomax  He denies urgency, urinary frequency, dribbling, urinary retention, dysuria, hematuria, leakage or incontinence. He has well-formed bowel movements. He denies blood or mucous in the stool. He denies rectal pain and states a no history of hemorrhoids. He had his last colonoscopy in  2022 and his next colonoscopy is due in 2027. He has never received ADT therapy in the past. He is sexually active and no issues with erections and on no ED medication needed.

## 2024-03-04 NOTE — REVIEW OF SYSTEMS
[Negative] : Allergic/Immunologic [Anal Pain: Grade 0] : Anal Pain: Grade 0 [Diarrhea: Grade 0] : Diarrhea: Grade 0 [Constipation: Grade 0] : Constipation: Grade 0 [Dyspepsia: Grade 0] : Dyspepsia: Grade 0 [Dysphagia: Grade 0] : Dysphagia: Grade 0 [Esophagitis: Grade 0] : Esophagitis: Grade 0 [Fecal Incontinence: Grade 0] : Fecal Incontinence: Grade 0 [Gastroparesis: Grade 0] : Gastroparesis: Grade 0 [Nausea: Grade 0] : Nausea: Grade 0 [Proctitis: Grade 0] : Proctitis: Grade 0 [Rectal Pain: Grade 0] : Rectal Pain: Grade 0 [Small Intestinal Obstruction: Grade 0] : Small Intestinal Obstruction: Grade 0 [Vomiting: Grade 0] : Vomiting: Grade 0 [Hematuria: Grade 0] : Hematuria: Grade 0 [Urinary Incontinence: Grade 0] : Urinary Incontinence: Grade 0  [Urinary Retention: Grade 0] : Urinary Retention: Grade 0 [Urinary Tract Pain: Grade 0] : Urinary Tract Pain: Grade 0 [Urinary Frequency: Grade 0] : Urinary Frequency: Grade 0 [Urinary Urgency: Grade 0] : Urinary Urgency: Grade 0 [Erectile Dysfunction: Grade 0] : Erectile Dysfunction: Grade 0 [Ejaculation Disorder: Grade 0] : Ejaculation Disorder: Grade 0

## 2024-03-12 ENCOUNTER — TRANSCRIPTION ENCOUNTER (OUTPATIENT)
Age: 69
End: 2024-03-12

## 2024-03-13 ENCOUNTER — NON-APPOINTMENT (OUTPATIENT)
Age: 69
End: 2024-03-13

## 2024-03-18 ENCOUNTER — TRANSCRIPTION ENCOUNTER (OUTPATIENT)
Age: 69
End: 2024-03-18

## 2024-03-19 ENCOUNTER — NON-APPOINTMENT (OUTPATIENT)
Age: 69
End: 2024-03-19

## 2024-04-03 ENCOUNTER — APPOINTMENT (OUTPATIENT)
Dept: UROLOGY | Facility: CLINIC | Age: 69
End: 2024-04-03

## 2024-08-09 PROCEDURE — 77316 BRACHYTX ISODOSE PLAN SIMPLE: CPT | Mod: 26

## 2024-08-12 ENCOUNTER — TRANSCRIPTION ENCOUNTER (OUTPATIENT)
Age: 69
End: 2024-08-12

## 2024-08-12 ENCOUNTER — APPOINTMENT (OUTPATIENT)
Dept: RADIATION ONCOLOGY | Facility: CLINIC | Age: 69
End: 2024-08-12
Payer: COMMERCIAL

## 2024-08-12 VITALS
RESPIRATION RATE: 20 BRPM | DIASTOLIC BLOOD PRESSURE: 91 MMHG | BODY MASS INDEX: 22.5 KG/M2 | OXYGEN SATURATION: 99 % | WEIGHT: 180 LBS | SYSTOLIC BLOOD PRESSURE: 158 MMHG | HEART RATE: 60 BPM | TEMPERATURE: 98.4 F

## 2024-08-12 VITALS
OXYGEN SATURATION: 97 % | WEIGHT: 178.35 LBS | TEMPERATURE: 97 F | RESPIRATION RATE: 16 BRPM | HEIGHT: 75 IN | HEART RATE: 52 BPM | DIASTOLIC BLOOD PRESSURE: 94 MMHG | SYSTOLIC BLOOD PRESSURE: 161 MMHG

## 2024-08-12 DIAGNOSIS — C61 MALIGNANT NEOPLASM OF PROSTATE: ICD-10-CM

## 2024-08-12 PROBLEM — Z78.9 OTHER SPECIFIED HEALTH STATUS: Chronic | Status: INACTIVE | Noted: 2023-12-14 | Resolved: 2024-08-12

## 2024-08-12 PROCEDURE — 99213 OFFICE O/P EST LOW 20 MIN: CPT

## 2024-08-12 NOTE — HISTORY OF PRESENT ILLNESS
[FreeTextEntry1] :   El Ruano is a 69 year old male with no family history of Prostate Ca who has a known history of Prostate Cancer in 2012 in [place] with Dr. Snowden as per pt. treated with cyberknife. treatment for prostate cancer. [place] score and prior records are not available to review and pt will obtain the records for us to review.  Mr. Dumont September 2023 his PCP noted an increase in his PSA value  4.21ng.mL. He underwent MRi fusion Bx on 11/17/2023 that revealed 6 out of 13 cores are positive with 20%-90% tissue involvement. one core with Matthew score of 7 (4+3) and five cores with a [place] Score 7 (3+4) with a diagnosis of unfavorable intermediate risk Prostatic Adenocarcinoma,  (Urologist: Dr. Barbara Ortiz Ma)  PSA Trend: ng/mL  09/2021:         0.17ng/mL 09/2022:         1.32ng/mL 09/12/2022:    4.21ng/mL 11/27/2023:    6.64ng/mL  MRI Prostate with and without  12/1/2023  IMPRESSION: Left, posterior medial-lateral (PZpm-pl), base-to-midgland, peripheral zone lesion as detailed above. Overlying capsular bulge suspicious for NIKOLAI. SILVESTRE and boundary marked in DCAD. PIRADS 5 - Very high (clinically significant cancer is highly likely to be present)  No seminal vesicle invasion or pelvic lymphadenopathy.  Prostate Volume: 27 mL PSA density: 0.24 ng/mL/mL  LESION: #1 Location: Left, posterior medial-lateral (PZpm-pl), base-to-midgland, peripheral zone. Slice#: Series 4, Image 14. Size (transverse, AP, CC): 22 x 10 x 16 mm. T2-WI: 2 - diffuse mild hypointensity, indistinct margin. DWI: 5 - Focal markedly hypointense on ADC and markedly hyperintense on high b-value DWI; greater than 1.5 cm in greatest dimension and invasive behavior. DCE: Positive - focal, and; earlier than or contemporaneous with enhancement of adjacent normal prostatic tissues, and; corresponds to suspicious finding on T2W and/or DWI. Extra-prostatic extension: Tumor bulges or deforms capsule without neurovascular bundle thickening. Prior Comparison: None. PI-RADS Assessment Category: 5, Very High  Neurovascular bundle: Left-sided neurovascular bundle abutment. Seminal vesicles: No seminal vesicle invasion. Lymph nodes: No pelvic adenopathy. Bones: No suspicious lesions identified. Urinary bladder: Underdistended limiting evaluation. Other: None.   PATHOLOGY: 12/1/2023  6 out of 13 cores positive [place] Score 7 =(4+3)-  1 core [place] Score 7 =( 3+4)   5 cores GG 20-90% tissue involvement  PET /CT PSMA  11/2/2023  IMPRESSION:  No current evidence of  PSMA (PYLARIFY) avid lesions..   Patient presents today for re discussion of brachytherapy. Questions answered and preop instructions reviewed with patient.

## 2024-08-12 NOTE — HISTORY OF PRESENT ILLNESS
[FreeTextEntry1] :   El Ruano is a 69 year old male with no family history of Prostate Ca who has a known history of Prostate Cancer in 2012 in [place] with Dr. Snowden as per pt. treated with cyberknife. treatment for prostate cancer. [place] score and prior records are not available to review and pt will obtain the records for us to review.  Mr. Dumont September 2023 his PCP noted an increase in his PSA value  4.21ng.mL. He underwent MRi fusion Bx on 11/17/2023 that revealed 6 out of 13 cores are positive with 20%-90% tissue involvement. one core with Matthew score of 7 (4+3) and five cores with a [place] Score 7 (3+4) with a diagnosis of unfavorable intermediate risk Prostatic Adenocarcinoma,  (Urologist: Dr. Barbara Ortiz Ma)  PSA Trend: ng/mL  09/2021:         0.17ng/mL 09/2022:         1.32ng/mL 09/12/2022:    4.21ng/mL 11/27/2023:    6.64ng/mL  MRI Prostate with and without  12/1/2023  IMPRESSION: Left, posterior medial-lateral (PZpm-pl), base-to-midgland, peripheral zone lesion as detailed above. Overlying capsular bulge suspicious for NIKOLAI. SILVESTRE and boundary marked in DCAD. PIRADS 5 - Very high (clinically significant cancer is highly likely to be present)  No seminal vesicle invasion or pelvic lymphadenopathy.  Prostate Volume: 27 mL PSA density: 0.24 ng/mL/mL  LESION: #1 Location: Left, posterior medial-lateral (PZpm-pl), base-to-midgland, peripheral zone. Slice#: Series 4, Image 14. Size (transverse, AP, CC): 22 x 10 x 16 mm. T2-WI: 2 - diffuse mild hypointensity, indistinct margin. DWI: 5 - Focal markedly hypointense on ADC and markedly hyperintense on high b-value DWI; greater than 1.5 cm in greatest dimension and invasive behavior. DCE: Positive - focal, and; earlier than or contemporaneous with enhancement of adjacent normal prostatic tissues, and; corresponds to suspicious finding on T2W and/or DWI. Extra-prostatic extension: Tumor bulges or deforms capsule without neurovascular bundle thickening. Prior Comparison: None. PI-RADS Assessment Category: 5, Very High  Neurovascular bundle: Left-sided neurovascular bundle abutment. Seminal vesicles: No seminal vesicle invasion. Lymph nodes: No pelvic adenopathy. Bones: No suspicious lesions identified. Urinary bladder: Underdistended limiting evaluation. Other: None.   PATHOLOGY: 12/1/2023  6 out of 13 cores positive [place] Score 7 =(4+3)-  1 core [place] Score 7 =( 3+4)   5 cores GG 20-90% tissue involvement  PET /CT PSMA  11/2/2023  IMPRESSION:  No current evidence of  PSMA (PYLARIFY) avid lesions..   Patient presents today for re discussion of brachytherapy. Questions answered and preop instructions reviewed with patient.

## 2024-08-12 NOTE — DISEASE MANAGEMENT
[2] : T2 [c] : c [X] : MX [Biopsy with Fusion] : Patient had a biopsy with fusion on [7(4+3)] : Fusion Biopsy Hodge Score: 7(4+3) [5] : 5 [IIC] : IIC [BiopsyDate] : 12/14/2023 [MeasuredProstateVolume] : 27ml [TotalCores] : 13 [TotalPositiveCores] : 6 [MaxCoreInvolvement] : 90%

## 2024-08-12 NOTE — DISEASE MANAGEMENT
[2] : T2 [c] : c [X] : MX [Biopsy with Fusion] : Patient had a biopsy with fusion on [7(4+3)] : Fusion Biopsy [place] Score: 7(4+3) [5] : 5 [IIC] : IIC [BiopsyDate] : 12/14/2023 [MeasuredProstateVolume] : 27ml [TotalCores] : 13 [TotalPositiveCores] : 6 [MaxCoreInvolvement] : 90%

## 2024-08-12 NOTE — ASU PATIENT PROFILE, ADULT - NSICDXPASTSURGICALHX_GEN_ALL_CORE_FT
PAST SURGICAL HISTORY:  History of prostate biopsy      PAST SURGICAL HISTORY:  H/O circumcision child    History of prostate biopsy x 2

## 2024-08-13 ENCOUNTER — OUTPATIENT (OUTPATIENT)
Dept: INPATIENT UNIT | Facility: HOSPITAL | Age: 69
LOS: 1 days | Discharge: HOME | End: 2024-08-13
Payer: COMMERCIAL

## 2024-08-13 ENCOUNTER — TRANSCRIPTION ENCOUNTER (OUTPATIENT)
Age: 69
End: 2024-08-13

## 2024-08-13 VITALS
DIASTOLIC BLOOD PRESSURE: 94 MMHG | RESPIRATION RATE: 18 BRPM | HEART RATE: 53 BPM | SYSTOLIC BLOOD PRESSURE: 161 MMHG | OXYGEN SATURATION: 99 %

## 2024-08-13 DIAGNOSIS — Z98.890 OTHER SPECIFIED POSTPROCEDURAL STATES: Chronic | ICD-10-CM

## 2024-08-13 PROCEDURE — 77316 BRACHYTX ISODOSE PLAN SIMPLE: CPT

## 2024-08-13 PROCEDURE — 77778 APPLY INTERSTIT RADIAT COMPL: CPT | Mod: 26

## 2024-08-13 PROCEDURE — 77370 RADIATION PHYSICS CONSULT: CPT

## 2024-08-13 PROCEDURE — 77336 RADIATION PHYSICS CONSULT: CPT

## 2024-08-13 PROCEDURE — 77332 RADIATION TREATMENT AID(S): CPT

## 2024-08-13 PROCEDURE — 77778 APPLY INTERSTIT RADIAT COMPL: CPT

## 2024-08-13 PROCEDURE — 77295 3-D RADIOTHERAPY PLAN: CPT | Mod: 26

## 2024-08-13 PROCEDURE — C1715: CPT

## 2024-08-13 PROCEDURE — 77331 SPECIAL RADIATION DOSIMETRY: CPT | Mod: 26

## 2024-08-13 PROCEDURE — 55875 TRANSPERI NEEDLE PLACE PROS: CPT

## 2024-08-13 PROCEDURE — 55874 TPRNL PLMT BIODEGRDABL MATRL: CPT

## 2024-08-13 PROCEDURE — 77295 3-D RADIOTHERAPY PLAN: CPT

## 2024-08-13 PROCEDURE — 77331 SPECIAL RADIATION DOSIMETRY: CPT

## 2024-08-13 PROCEDURE — 77332 RADIATION TREATMENT AID(S): CPT | Mod: 26

## 2024-08-13 PROCEDURE — C1889: CPT

## 2024-08-13 PROCEDURE — C2641: CPT

## 2024-08-13 PROCEDURE — 77300 RADIATION THERAPY DOSE PLAN: CPT

## 2024-08-13 DEVICE — NDL BRACHYTHERAPY 18G: Type: IMPLANTABLE DEVICE | Status: FUNCTIONAL

## 2024-08-13 DEVICE — HYDROGEL SPACEOAR DISP 10ML: Type: IMPLANTABLE DEVICE | Status: FUNCTIONAL

## 2024-08-13 RX ORDER — TAMSULOSIN HYDROCHLORIDE 0.4 MG/1
0.4 CAPSULE ORAL
Qty: 30 | Refills: 5 | Status: ACTIVE | COMMUNITY
Start: 2024-08-13 | End: 1900-01-01

## 2024-08-13 RX ORDER — DEXTROSE MONOHYDRATE, SODIUM CHLORIDE, SODIUM LACTATE, CALCIUM CHLORIDE, MAGNESIUM CHLORIDE 1.5; 538; 448; 18.4; 5.08 G/100ML; MG/100ML; MG/100ML; MG/100ML; MG/100ML
1000 SOLUTION INTRAPERITONEAL
Refills: 0 | Status: DISCONTINUED | OUTPATIENT
Start: 2024-08-13 | End: 2024-08-13

## 2024-08-13 RX ADMIN — DEXTROSE MONOHYDRATE, SODIUM CHLORIDE, SODIUM LACTATE, CALCIUM CHLORIDE, MAGNESIUM CHLORIDE 75 MILLILITER(S): 1.5; 538; 448; 18.4; 5.08 SOLUTION INTRAPERITONEAL at 10:16

## 2024-08-13 NOTE — ASU DISCHARGE PLAN (ADULT/PEDIATRIC) - NS MD DC FALL RISK RISK
For information on Fall & Injury Prevention, visit: [email or web address] OR  [email or web address] OR  https://www.cdc.gov/steadi/patient.html

## 2024-08-14 ENCOUNTER — NON-APPOINTMENT (OUTPATIENT)
Age: 69
End: 2024-08-14

## 2024-09-24 ENCOUNTER — RESULT REVIEW (OUTPATIENT)
Age: 69
End: 2024-09-24

## 2024-09-24 ENCOUNTER — OUTPATIENT (OUTPATIENT)
Dept: OUTPATIENT SERVICES | Facility: HOSPITAL | Age: 69
LOS: 1 days | End: 2024-09-24
Payer: COMMERCIAL

## 2024-09-24 DIAGNOSIS — Z98.890 OTHER SPECIFIED POSTPROCEDURAL STATES: Chronic | ICD-10-CM

## 2024-09-24 PROBLEM — C61 MALIGNANT NEOPLASM OF PROSTATE: Chronic | Status: ACTIVE | Noted: 2024-08-12

## 2024-09-24 PROCEDURE — 71046 X-RAY EXAM CHEST 2 VIEWS: CPT

## 2024-09-24 PROCEDURE — 71046 X-RAY EXAM CHEST 2 VIEWS: CPT | Mod: 26

## 2025-01-09 DIAGNOSIS — N52.35 ERECTILE DYSFUNCTION FOLLOWING RADIATION THERAPY: ICD-10-CM

## 2025-01-13 RX ORDER — TADALAFIL 20 MG/1
20 TABLET ORAL
Qty: 30 | Refills: 3 | Status: ACTIVE | COMMUNITY
Start: 2025-01-09

## 2025-02-12 ENCOUNTER — RX RENEWAL (OUTPATIENT)
Age: 70
End: 2025-02-12

## 2025-05-12 NOTE — ASU DISCHARGE PLAN (ADULT/PEDIATRIC) - OK TO LEAVE MESSAGE ON VOICEMAIL
No

## 2025-06-18 RX ORDER — TAMSULOSIN HYDROCHLORIDE 0.4 MG/1
0.4 CAPSULE ORAL
Qty: 90 | Refills: 3 | Status: ACTIVE | COMMUNITY
Start: 2025-06-18 | End: 1900-01-01

## (undated) DEVICE — SYR CATH TIP 2 OZ

## (undated) DEVICE — DRAPE TOWEL BLUE 17" X 24"

## (undated) DEVICE — SYR LUER LOK 10CC

## (undated) DEVICE — PREP CHLORAPREP HI-LITE ORANGE 26ML

## (undated) DEVICE — Device

## (undated) DEVICE — NDL BIOPSY CHIBA 22G X 20CM

## (undated) DEVICE — GRID BRACHYTHERAPY EZ 18G

## (undated) DEVICE — DRSG TELFA 3 X 8

## (undated) DEVICE — BALLOON ENDOCAVITY 2X14CM

## (undated) DEVICE — GLV 7.5 PROTEXIS (WHITE)

## (undated) DEVICE — NDL MAX CORE 18G X 25CM

## (undated) DEVICE — PLASTIC SOLUTION BOWL 160Z

## (undated) DEVICE — SYR LUER LOK 50CC

## (undated) DEVICE — DRAPE MEDIUM SHEET 44" X 70"

## (undated) DEVICE — GLV 8 PROTEXIS (WHITE)

## (undated) DEVICE — PACK CYSTO

## (undated) DEVICE — NDL HYPO REGULAR BEVEL 25G X 1.5" (BLUE)